# Patient Record
Sex: MALE | Race: WHITE
[De-identification: names, ages, dates, MRNs, and addresses within clinical notes are randomized per-mention and may not be internally consistent; named-entity substitution may affect disease eponyms.]

---

## 2019-11-29 ENCOUNTER — HOSPITAL ENCOUNTER (EMERGENCY)
Dept: HOSPITAL 97 - ER | Age: 24
Discharge: HOME | End: 2019-11-29
Payer: SELF-PAY

## 2019-11-29 DIAGNOSIS — F19.10: ICD-10-CM

## 2019-11-29 DIAGNOSIS — F17.210: ICD-10-CM

## 2019-11-29 DIAGNOSIS — F12.10: ICD-10-CM

## 2019-11-29 DIAGNOSIS — F14.10: Primary | ICD-10-CM

## 2019-11-29 LAB
ALBUMIN SERPL BCP-MCNC: 4.2 G/DL (ref 3.4–5)
ALP SERPL-CCNC: 68 U/L (ref 45–117)
ALT SERPL W P-5'-P-CCNC: 34 U/L (ref 12–78)
AST SERPL W P-5'-P-CCNC: 25 U/L (ref 15–37)
BUN BLD-MCNC: 13 MG/DL (ref 7–18)
GLUCOSE SERPLBLD-MCNC: 139 MG/DL (ref 74–106)
HCT VFR BLD CALC: 41.4 % (ref 39.6–49)
INR BLD: 1.09
LYMPHOCYTES # SPEC AUTO: 2.7 K/UL (ref 0.7–4.9)
MAGNESIUM SERPL-MCNC: 2.1 MG/DL (ref 1.8–2.4)
METHAMPHET UR QL SCN: POSITIVE
NT-PROBNP SERPL-MCNC: 8 PG/ML (ref ?–125)
PMV BLD: 9.6 FL (ref 7.6–11.3)
POTASSIUM SERPL-SCNC: 3.4 MMOL/L (ref 3.5–5.1)
RBC # BLD: 4.43 M/UL (ref 4.33–5.43)
THC SERPL-MCNC: POSITIVE NG/ML
TROPONIN (EMERG DEPT USE ONLY): < 0.02 NG/ML (ref 0–0.04)

## 2019-11-29 PROCEDURE — 80048 BASIC METABOLIC PNL TOTAL CA: CPT

## 2019-11-29 PROCEDURE — 84484 ASSAY OF TROPONIN QUANT: CPT

## 2019-11-29 PROCEDURE — 80076 HEPATIC FUNCTION PANEL: CPT

## 2019-11-29 PROCEDURE — 36415 COLL VENOUS BLD VENIPUNCTURE: CPT

## 2019-11-29 PROCEDURE — 85025 COMPLETE CBC W/AUTO DIFF WBC: CPT

## 2019-11-29 PROCEDURE — 71045 X-RAY EXAM CHEST 1 VIEW: CPT

## 2019-11-29 PROCEDURE — 80329 ANALGESICS NON-OPIOID 1 OR 2: CPT

## 2019-11-29 PROCEDURE — 83735 ASSAY OF MAGNESIUM: CPT

## 2019-11-29 PROCEDURE — 99285 EMERGENCY DEPT VISIT HI MDM: CPT

## 2019-11-29 PROCEDURE — 93005 ELECTROCARDIOGRAM TRACING: CPT

## 2019-11-29 PROCEDURE — 80307 DRUG TEST PRSMV CHEM ANLYZR: CPT

## 2019-11-29 PROCEDURE — 80320 DRUG SCREEN QUANTALCOHOLS: CPT

## 2019-11-29 PROCEDURE — 96361 HYDRATE IV INFUSION ADD-ON: CPT

## 2019-11-29 PROCEDURE — 96360 HYDRATION IV INFUSION INIT: CPT

## 2019-11-29 PROCEDURE — 85730 THROMBOPLASTIN TIME PARTIAL: CPT

## 2019-11-29 PROCEDURE — 85610 PROTHROMBIN TIME: CPT

## 2019-11-29 PROCEDURE — 83880 ASSAY OF NATRIURETIC PEPTIDE: CPT

## 2019-11-29 NOTE — EDPHYS
Physician Documentation                                                                           

 [facility]                                                                 

Name: Anam Johnston                                                                               

Age: 24 yrs                                                                                       

Sex: Male                                                                                         

: 1995                                                                                   

MRN: [id number]                                                                                   

Arrival Date: 2019                                                                          

Time: 18:41                                                                                       

Account#: [id number]                                                                            

Bed 25                                                                                            

Private MD:                                                                                       

ED Physician Jakob Gonsalez                                                                      

HPI:                                                                                              

                                                                                             

21:09 This 24 yrs old  Male presents to ER via Ambulatory with complaints of Chest   jmm 

      Pain.                                                                                       

21:09 The patient or guardian reports chest pain that is located primarily in the substernal  [facility] 

      area. The pain does not radiate. Associated signs and symptoms: Pertinent negatives:        

      abdominal pain, cough. The chest pain is described as aching, a pressure, sharp.            

      Duration: The patient or guardian reports a single episode, that is still ongoing, but      

      improving. Modifying factors: The symptoms are alleviated by nothing. the symptoms are      

      aggravated by nothing. This is a 24 year old male with no chronic medical conditions        

      that presents to the ED with complaints of chest pain beginning today at around 5 pm        

      today while sitting on the cough. Patient admits to cocaine, adderall, and marijuana        

      use last evening. Denies use today. .                                                       

                                                                                                  

Historical:                                                                                       

- Allergies:                                                                                      

18:49 No Known Allergies;                                                                     aa5 

- PMHx:                                                                                           

18:49 None;                                                                                   aa5 

- PSHx:                                                                                           

18:49 None;                                                                                   aa5 

                                                                                                  

- Immunization history:: Flu vaccine is not up to date.                                           

- Social history:: Smoking status: Patient uses tobacco products, smokes one-half pack            

  cigarettes per day, Patient uses street drugs, cocaine, marijuana, Methamphetamine              

  (Meth).                                                                                         

- Ebola Screening: : No symptoms or risks identified at this time.                                

                                                                                                  

                                                                                                  

ROS:                                                                                              

21:09 Constitutional: Negative for fever, chills, and weight loss.                            jmm 

21:09 Abdomen/GI: Negative for abdominal pain, nausea, vomiting, diarrhea, and constipation.      

21:09 Cardiovascular: Positive for chest pain.                                                    

21:09 All other systems are negative.                                                             

                                                                                                  

Exam:                                                                                             

21:09 Constitutional:  This is a well developed, well nourished patient who is awake, alert,  jmm 

      and in no acute distress. Head/Face:  atraumatic. Eyes:  EOMI, no conjunctival erythema     

      appreciated ENT:  Moist Mucus Membranes Neck:  Trachea midline, Supple Chest/axilla:        

      Normal chest wall appearance and motion.   Cardiovascular:  Regular rate and rhythm.        

      No edema appreciated                                                                        

21:09 Back:  Normal ROM Skin:  General appearance color normal MS/ Extremity:  Moves all          

      extremities, no obvious deformities appreciated, no edema noted to the lower                

      extremities  Neuro:  Awake and alert, normal gait Psych:  Behavior is normal, Mood is       

      normal, Patient is cooperative and pleasant                                                 

21:09 Cardiovascular: Rate: normal, Rhythm: regular, Pulses: no pulse deficits are                

      appreciated.                                                                                

                                                                                                  

Vital Signs:                                                                                      

18:48  / 105; Pulse 128; Resp 22 S; Temp 99.2(TE); Pulse Ox 100% on R/A; Weight 97.52   aa5 

      kg (R); Height 6 ft. 1 in. (185.42 cm) (R); Pain 0/10;                                      

19:45  / 88; Pulse 101; Resp 22; Pulse Ox 100% on R/A;                                  wh  

20:57  / 92; Pulse 92; Resp 18; Pulse Ox 100% on R/A;                                   wh  

22:22  / 83; Pulse 74; Resp 19; Pulse Ox 100% on R/A;                                   wh  

18:48 Body Mass Index 28.37 (97.52 kg, 185.42 cm)                                             aa5 

                                                                                                  

MDM:                                                                                              

19:47 Patient medically screened.                                                             paul 

22:39 Data reviewed: vital signs, nurses notes. Counseling: I had a detailed discussion with  crow 

      the patient and/or guardian regarding: the historical points, exam findings, and any        

      diagnostic results supporting the discharge/admit diagnosis, lab results, radiology         

      results, the need for outpatient follow up, to return to the emergency department if        

      symptoms worsen or persist or if there are any questions or concerns that arise at          

      home. ED course: Pain has decreased in the ED. Patient advised to discontinue drug use.     

      Labs/ekg not consistent with ACS. .                                                         

                                                                                                  

                                                                                             

19:34 Order name: Acetaminophen                                                                 

                                                                                             

19:34 Order name: Basic Metabolic Panel                                                         

                                                                                             

19:34 Order name: CBC with Diff                                                                 

                                                                                             

19:34 Order name: ETOH Level                                                                    

                                                                                             

19:34 Order name: Hepatic Function                                                              

                                                                                             

19:34 Order name: PT-INR                                                                        

                                                                                             

19:34 Order name: Ptt, Activated                                                                

                                                                                             

19:34 Order name: Salicylate                                                                    

                                                                                             

19:34 Order name: Urine Drug Screen                                                             

                                                                                             

19:40 Order name: Magnesium                                                                   [facility] 

                                                                                             

19:40 Order name: NT PRO-BNP                                                                  [facility] 

                                                                                             

19:40 Order name: Troponin (emerg Dept Use Only)                                              [facility] 

                                                                                             

20:09 Order name: CBC with Automated Diff; Complete Time: 20:24                               [facility]

                                                                                             

20:09 Order name: Protime (+INR); Complete Time: 20:24                                        [facility]

                                                                                             

19:34 Order name: EKG; Complete Time: 19:35                                                     

                                                                                             

19:34 Order name: EKG - Nurse/Tech; Complete Time: 19:46                                        

                                                                                             

19:34 Order name: IV Saline Lock; Complete Time: 19:46                                          

                                                                                             

19:40 Order name: XRAY Chest (1 view)                                                         [facility] 

                                                                                             

20:09 Order name: PTT, Activated Partial Thromb; Complete Time: 20:24                         [facility]

                                                                                             

20:13 Order name: Alcohol Serum/Plasma; Complete Time: 20:24                                  [facility]

                                                                                             

20:26 Order name: Troponin (Emerg Dept Use Only); Complete Time: 20:34                        [facility]

                                                                                             

20:26 Order name: NT PRO-BNP; Complete Time: 20:34                                            [facility]

                                                                                             

20:26 Order name: Magnesium; Complete Time: 20:34                                             [facility]

                                                                                             

21:06 Order name: RAD; Complete Time: 21:29                                                   [facility]

                                                                                             

21:09 Order name: Basic Metabolic Panel; Complete Time: 21:29                                 [facility]

                                                                                             

21:09 Order name: Liver (Hepatic) Function; Complete Time: 21:29                              [facility]

                                                                                             

21:09 Order name: Acetaminophen Level; Complete Time: 21:29                                   [facility]

                                                                                             

21:36 Order name: Urine Drug Screen; Complete Time: 21:38                                     [facility]

                                                                                             

21:38 Order name: Salicylates Level; Complete Time: 21:39                                     [facility]

                                                                                             

19:34 Order name: Labs collected and sent; Complete Time: 19:46                                 

                                                                                             

19:34 Order name: Urine Dipstick-Ancillary (obtain specimen); Complete Time: 21:08              

                                                                                             

19:40 Order name: Cardiac monitoring; Complete Time: 19:45                                    [facility] 

                                                                                             

19:40 Order name: O2 Per Protocol; Complete Time: 19:45                                       [facility] 

                                                                                             

19:40 Order name: O2 Sat Monitoring; Complete Time: 19:45                                     [facility] 

                                                                                                  

Administered Medications:                                                                         

20:06 Drug: NS 0.9% 1000 ml Route: IV; Rate: 1 bolus; Site: left antecubital;                   

22:23 Follow up: Response: No adverse reaction; IV Status: Completed infusion                   

                                                                                                  

                                                                                                  

Disposition:                                                                                      

19 22:40 Discharged to Home. Impression: Chest pain, unspecified, Drug Abuse.               

- Condition is Stable.                                                                            

- Discharge Instructions: Nonspecific Chest Pain, Drug Overdose.                                  

                                                                                                  

- Medication Reconciliation Form, Thank You Letter, Antibiotic Education, Prescription            

  Opioid Use form.                                                                                

- Follow up: Private Physician; When: 2 - 3 days; Reason: Recheck today's complaints,             

  Continuance of care, Re-evaluation by your physician.                                           

                                                                                                  

                                                                                                  

                                                                                                  

Addendum:                                                                                         

2019                                                                                        

     10:44 Co-signature as Attending Physician, Jakob Gonsalez MD I agree with the assessment and  c
ha

           plan of care. PA/NP's history reviewed, patient interviewed, and examined.             

                                                                                                  

Signatures:                                                                                       

Dispatcher [employer]                           EDJakob Blair MD MD cha Mickail, Joel, PA PA jmm Calderon, Audri, RN                     RN   aa5                                                  

Mariely Solomon                                                                                   

                                                                                                  

Corrections: (The following items were deleted from the chart)                                    

                                                                                             

22:41 22:40 2019 22:40 Discharged to Home. Impression: Chest pain, unspecified.         [facility] 

      Condition is Stable. Forms are Medication Reconciliation Form, Thank You Letter,            

      Antibiotic Education, Prescription Opioid Use. Follow up: Private Physician; When: 2 -      

      3 days; Reason: Recheck today's complaints, Continuance of care, Re-evaluation by your      

      physician. [facility]                                                                              

22:55 22:41 2019 22:40 Discharged to Home. Impression: Chest pain, unspecified; Drug    wh  

      Abuse. Condition is Stable. Discharge Instructions: Nonspecific Chest Pain, Drug            

      Overdose. Forms are Medication Reconciliation Form, Thank You Letter, Antibiotic            

      Education, Prescription Opioid Use. Follow up: Private Physician; When: 2 - 3 days;         

      Reason: Recheck today's complaints, Continuance of care, Re-evaluation by your              

      physician. [facility]                                                                              

                                                                                                  

**************************************************************************************************

## 2019-11-29 NOTE — RAD REPORT
EXAM DESCRIPTION:  RAD - Chest Single View - 11/29/2019 7:55 pm

 

CLINICAL HISTORY:  Chest pain

 

COMPARISON:  None.

 

TECHNIQUE:  AP portable chest image was obtained 1953 hours .

 

FINDINGS:  Lungs are clear. Heart and vasculature are normal. No measurable pleural effusion and no p
neumothorax. No acute bony abnormality seen. No acute aortic findings suspected.

 

IMPRESSION:  No acute cardiopulmonary process.

## 2019-11-29 NOTE — ER
Nurse's Notes                                                                                     

 [facility]                                                                 

Name: Anam Johnston                                                                               

Age: 24 yrs                                                                                       

Sex: Male                                                                                         

: 1995                                                                                   

MRN: [id number]                                                                                   

Arrival Date: 2019                                                                          

Time: 18:41                                                                                       

Account#: [id number]                                                                            

Bed 25                                                                                            

Private MD:                                                                                       

Diagnosis: Chest pain, unspecified;Drug Abuse                                                     

                                                                                                  

Presentation:                                                                                     

                                                                                             

18:47 Presenting complaint: Patient states: heart palpitations that began 30 minutes PTA. Pt  aa5 

      reports doing meth,cocaine, marijuana, and Adderall last night. Transition of care:         

      patient was not received from another setting of care. Onset of symptoms was 2019. Risk Assessment: Do you want to hurt yourself or someone else? Patient            

      reports no desire to harm self or others. Initial Sepsis Screen: Does the patient meet      

      any 2 criteria? HR > 90 bpm. Does the patient have a suspected source of infection? No.     

      Patient's initial sepsis screen is negative. Care prior to arrival: None.                   

18:47 Method Of Arrival: Ambulatory                                                           aa5 

18:47 Acuity: DARRELL 2                                                                           aa5 

                                                                                                  

Historical:                                                                                       

- Allergies:                                                                                      

18:49 No Known Allergies;                                                                     aa5 

- PMHx:                                                                                           

18:49 None;                                                                                   aa5 

- PSHx:                                                                                           

18:49 None;                                                                                   aa5 

                                                                                                  

- Immunization history:: Flu vaccine is not up to date.                                           

- Social history:: Smoking status: Patient uses tobacco products, smokes one-half pack            

  cigarettes per day, Patient uses street drugs, cocaine, marijuana, Methamphetamine              

  (Meth).                                                                                         

- Ebola Screening: : No symptoms or risks identified at this time.                                

                                                                                                  

                                                                                                  

Screenin:00 Abuse screen: Denies threats or abuse. Denies injuries from another. Nutritional        wh  

      screening: No deficits noted. Tuberculosis screening: No symptoms or risk factors           

      identified. Fall Risk None identified.                                                      

                                                                                                  

Assessment:                                                                                       

19:35 General: Appears in no apparent distress. Behavior is calm, cooperative, appropriate    wh  

      for age. Pain: Complains of pain in chest Pain does not radiate. Pain currently is 8        

      out of 10 on a pain scale. Pain began suddenly. Neuro: Level of Consciousness is awake,     

      alert, obeys commands, Oriented to person, place, time, situation, Appropriate for age.     

      Cardiovascular: Reports chest pain, Heart tones S1 S2 Rhythm is sinus tachycardia.          

      Respiratory: Airway is patent Respiratory effort is even, unlabored, Respiratory            

      pattern is regular, symmetrical. GI: No signs and/or symptoms were reported involving       

      the gastrointestinal system. GI: Abdomen is flat, non-distended. : No signs and/or        

      symptoms were reported regarding the genitourinary system. EENT: No signs and/or            

      symptoms were reported regarding the EENT system. Derm: Skin is intact, is healthy with     

      good turgor, Skin is pink, warm \T\ dry. normal. Musculoskeletal: Circulation, motion,      

      and sensation intact.                                                                       

20:56 Reassessment: Patient appears in no apparent distress at this time. No changes from       

      previously documented assessment. Patient and/or family updated on plan of care and         

      expected duration. Pain level reassessed. Patient is alert, oriented x 3, equal             

      unlabored respirations, skin warm/dry/pink.                                                 

22:22 Reassessment: Patient appears in no apparent distress at this time. No changes from       

      previously documented assessment. Patient and/or family updated on plan of care and         

      expected duration. Pain level reassessed. Patient is alert, oriented x 3, equal             

      unlabored respirations, skin warm/dry/pink.                                                 

                                                                                                  

Vital Signs:                                                                                      

18:48  / 105; Pulse 128; Resp 22 S; Temp 99.2(TE); Pulse Ox 100% on R/A; Weight 97.52   aa5 

      kg (R); Height 6 ft. 1 in. (185.42 cm) (R); Pain 0/10;                                      

19:45  / 88; Pulse 101; Resp 22; Pulse Ox 100% on R/A;                                  wh  

20:57  / 92; Pulse 92; Resp 18; Pulse Ox 100% on R/A;                                   wh  

22:22  / 83; Pulse 74; Resp 19; Pulse Ox 100% on R/A;                                   wh  

18:48 Body Mass Index 28.37 (97.52 kg, 185.42 cm)                                             aa5 

                                                                                                  

ED Course:                                                                                        

18:41 Patient arrived in ED.                                                                  aa5 

18:47 Arm band placed on.                                                                     aa5 

19:04 Triage completed.                                                                       aa5 

19:05 EKG completed in triage. Results shown to MD.                                           aa5 

19:07 Mariely Solomon is Primary Nurse.                                                           

19:27 Ruddy Kovacs PA is PHCP.                                                              [facility] 

19:27 Jakob Gonsalez MD is Attending Physician.                                             [facility] 

19:30 Patient maintains SpO2 saturation greater than 95% on room air.                           

19:51 Initial lab(s) drawn, by me, sent to lab. Inserted saline lock: 20 gauge in left        cm6 

      antecubital area, using aseptic technique.                                                  

21:00 Patient has correct armband on for positive identification. Bed in low position. Call     

      light in reach. Side rails up X 1. Cardiac monitor on. Pulse ox on. NIBP on.                

22:53 No provider procedures requiring assistance completed. IV discontinued, intact,           

      bleeding controlled, No redness/swelling at site.                                           

                                                                                                  

Administered Medications:                                                                         

20:06 Drug: NS 0.9% 1000 ml Route: IV; Rate: 1 bolus; Site: left antecubital;                   

22:23 Follow up: Response: No adverse reaction; IV Status: Completed infusion                   

                                                                                                  

                                                                                                  

Outcome:                                                                                          

22:40 Discharge ordered by MD.                                                                crwo 

22:54 Discharged to home ambulatory, with family.                                               

22:54 Condition: stable                                                                           

22:54 Discharge instructions given to patient, Instructed on discharge instructions, follow       

      up and referral plans. POC non Specific chest pain and drug overdose Demonstrated           

      understanding of instructions, follow-up care, POC                                          

22:55 Patient left the ED.                                                                      

                                                                                                  

Signatures:                                                                                       

Ruddy Kovacs PA PA jmm Calderon, Audri, RN                     RN   aa5                                                  

Mariely Solomon Candace cm6                                                  

                                                                                                  

**************************************************************************************************

## 2019-11-30 VITALS — SYSTOLIC BLOOD PRESSURE: 132 MMHG | DIASTOLIC BLOOD PRESSURE: 83 MMHG

## 2019-11-30 VITALS — TEMPERATURE: 99.2 F | OXYGEN SATURATION: 100 %

## 2019-11-30 NOTE — EKG
Test Date:    2019-11-29               Test Time:    19:06:37

Technician:   DEMETRIST                                    

                                                     

MEASUREMENT RESULTS:                                       

Intervals:                                           

Rate:         121                                    

VT:           166                                    

QRSD:         98                                     

QT:           306                                    

QTc:          434                                    

Axis:                                                

P:            64                                     

VT:           166                                    

QRS:          4                                      

T:            41                                     

                                                     

INTERPRETIVE STATEMENTS:                                       

                                                     

Sinus tachycardia

Otherwise normal ECG

No previous ECG available for comparison



Electronically Signed On 11-30-19 08:22:29 CST by Burak Mike

## 2023-06-20 ENCOUNTER — HOSPITAL ENCOUNTER (EMERGENCY)
Dept: HOSPITAL 97 - ER | Age: 28
Discharge: HOME | End: 2023-06-20
Payer: SELF-PAY

## 2023-06-20 VITALS — DIASTOLIC BLOOD PRESSURE: 91 MMHG | TEMPERATURE: 98.1 F | SYSTOLIC BLOOD PRESSURE: 135 MMHG | OXYGEN SATURATION: 98 %

## 2023-06-20 DIAGNOSIS — L03.115: Primary | ICD-10-CM

## 2023-06-20 PROCEDURE — 96372 THER/PROPH/DIAG INJ SC/IM: CPT

## 2023-06-20 PROCEDURE — 99284 EMERGENCY DEPT VISIT MOD MDM: CPT

## 2023-06-20 NOTE — ER
Nurse's Notes                                                                                     

 [facility]                                                                 

Name: Anam Johnston                                                                               

Age: 27 yrs                                                                                       

Sex: Male                                                                                         

: 1995                                                                                   

MRN: [id number]                                                                                   

Arrival Date: 2023                                                                          

Time: 09:45                                                                                       

Account#: [id number]                                                                            

Bed 11                                                                                            

Private MD:                                                                                       

Diagnosis: Cutaneous abscess of limb, unspecified;Cellulitis of left lower limb                   

                                                                                                  

Presentation:                                                                                     

                                                                                             

10:06 Chief complaint: Patient states: noticed an abscess on left knee , got bigger     iw  

      yesterday and it turned black today it looks better but it hurts a lot around the knee.     

      Coronavirus screen: At this time, the client does not indicate any symptoms associated      

      with coronavirus-19. Ebola Screen: Patient negative for fever greater than or equal to      

      101.5 degrees Fahrenheit, and additional compatible Ebola Virus Disease symptoms            

      Patient denies exposure to infectious person. Patient denies travel to an                   

      Ebola-affected area in the 21 days before illness onset. No symptoms or risks               

      identified at this time. Initial Sepsis Screen: Does the patient meet any 2 criteria?       

      No. Patient's initial sepsis screen is negative. Does the patient have a suspected          

      source of infection? No. Patient's initial sepsis screen is negative. Risk Assessment:      

      Do you want to hurt yourself or someone else? Patient reports no desire to harm self or     

      others. Onset of symptoms was 2023.                                                

10:06 Method Of Arrival: Ambulatory                                                           iw  

10:06 Acuity: DARRELL 4                                                                           iw  

                                                                                                  

Historical:                                                                                       

- Allergies:                                                                                      

10:07 No Known Allergies;                                                                     iw  

- Home Meds:                                                                                      

10:07 None [Active];                                                                          iw  

- PMHx:                                                                                           

10:07 None;                                                                                   iw  

- PSHx:                                                                                           

10:07 None;                                                                                   iw  

                                                                                                  

- Immunization history:: Adult Immunizations Client reports receiving the 2nd dose of             

  the Covid vaccine.                                                                              

- Social history:: Smoking status: Patient reports the use of cigarette tobacco                   

  products.                                                                                       

                                                                                                  

                                                                                                  

Screening:                                                                                        

10:13 [facility] ED Fall Risk Assessment (Adult) History of falling in the last 3 months,       iw  

      including since admission No falls in past 3 months (0 pts). Abuse screen: Denies           

      threats or abuse. Denies injuries from another. Nutritional screening: No deficits          

      noted. Tuberculosis screening: No symptoms or risk factors identified.                      

                                                                                                  

Assessment:                                                                                       

10:12 General: Appears in no apparent distress. Behavior is calm, cooperative. Pain:          iw  

      Complains of pain in left knee. Neuro: Level of Consciousness is awake, alert, obeys        

      commands, Oriented to person, place, time, situation, Moves all extremities. Full           

      function. Cardiovascular: Patient's skin is warm and dry. Respiratory: Respiratory          

      effort is even, unlabored, Respiratory pattern is regular, symmetrical. Derm: Skin is       

      intact, is healthy with good turgor, Skin is pink, warm \T\ dry. Abscess located on left    

      knee is dime sized, is red, was lanced by patient prior to arrival. Musculoskeletal:        

      Range of motion: intact in all extremities.                                                 

11:32 Reassessment: Patient is alert, oriented x 3, equal unlabored respirations, skin        aa5 

      warm/dry/pink.                                                                              

                                                                                                  

Vital Signs:                                                                                      

10:06  / 91; Pulse 89; Resp 16; Temp 98.1; Pulse Ox 98% ; Weight 102.06 kg; Height 6    iw  

      ft. 1 in. ; Pain 5/10;                                                                      

10:06 Body Mass Index 29.68 (102.06 kg, 185.42 cm)                                            iw  

10:06 Pain Scale: Adult                                                                       iw  

                                                                                                  

ED Course:                                                                                        

09:47 Patient arrived in ED.                                                                  am2 

10:07 Triage completed.                                                                       iw  

10:08 Arm band placed on.                                                                     iw  

10:12 Ciera Myles, RN is Primary Nurse.                                                   iw  

10:13 Jakob Gonsalez MD is Attending Physician.                                             paul 

11:02 Emmanuel Osullivan MD is Referral Physician.                                                 paul 

11:32 No provider procedures requiring assistance completed. Patient did not have IV access   aa5 

      during this emergency room visit.                                                           

                                                                                                  

Administered Medications:                                                                         

11:14 Drug: Doxycycline  mg Route: PO;                                                  aa5 

11:33 Follow up: Response: No adverse reaction                                                aa5 

11:15 Drug: Trimethoprim-Sulfamethoxazole PO (160 mg-800 mg (DS) 2 tablet Route: PO;          aa5 

11:33 Follow up: Response: No adverse reaction                                                aa5 

11:15 Drug: Mupirocin Topical Ointment 2 % 1 application Route: Topical; Site: affected area; aa5 

11:15 CANCELLED (Duplicate Order): Ketorolac IM 60 mg IM once                                 aa5 

11:15 Drug: Ketorolac IM 30 mg Route: IM; Site: right deltoid;                                aa5 

11:32 Follow up: Response: No adverse reaction                                                aa5 

                                                                                                  

                                                                                                  

Medication:                                                                                       

11:32 VIS not applicable for this client.                                                     aa5 

                                                                                                  

Outcome:                                                                                          

11:03 Discharge ordered by MD.                                                                paul 

11:32 Discharged to home ambulatory.                                                          aa5 

11:32 Condition: stable                                                                           

11:32 Discharge instructions given to patient, Instructed on discharge instructions, follow       

      up and referral plans. medication usage, Demonstrated understanding of instructions,        

      follow-up care, medications, Prescriptions given X 4.                                       

11:33 Patient left the ED.                                                                    aa5 

                                                                                                  

Signatures:                                                                                       

Jakob Gonsalez MD MD cha Williams, Irene RN                     RN   Johnna Peacock RN                     RN   aa5                                                  

Brooklynn Allison                                                  

                                                                                                  

**************************************************************************************************

## 2023-06-20 NOTE — EDPHYS
Physician Documentation                                                                           

 [facility]                                                                 

Name: Anam Johnston                                                                               

Age: 27 yrs                                                                                       

Sex: Male                                                                                         

: 1995                                                                                   

MRN: [id number]                                                                                   

Arrival Date: 2023                                                                          

Time: 09:45                                                                                       

Account#: [id number]                                                                            

Bed 11                                                                                            

Private MD:                                                                                       

[facility] Physician Jakob Gonsalez                                                                      

HPI:                                                                                              

                                                                                             

10:59 This 27 yrs old Black Male presents to ER via Ambulatory with complaints of Insect      paul 

      Bite, knee swelling.                                                                        

                                                                                                  

Historical:                                                                                       

- Allergies:                                                                                      

10:07 No Known Allergies;                                                                     iw  

- Home Meds:                                                                                      

10:07 None [Active];                                                                          iw  

- PMHx:                                                                                           

10:07 None;                                                                                   iw  

- PSHx:                                                                                           

10:07 None;                                                                                   iw  

                                                                                                  

- Immunization history:: Adult Immunizations Client reports receiving the 2nd dose of             

  the Covid vaccine.                                                                              

- Social history:: Smoking status: Patient reports the use of cigarette tobacco                   

  products.                                                                                       

                                                                                                  

                                                                                                  

ROS:                                                                                              

10:59 Constitutional: Negative for fever, chills, and weight loss, Eyes: Negative for injury, paul 

      pain, redness, and discharge, ENT: Negative for injury, pain, and discharge, Neck:          

      Negative for injury, pain, and swelling, Cardiovascular: Negative for chest pain,           

      palpitations, and edema, Respiratory: Negative for shortness of breath, cough,              

      wheezing, and pleuritic chest pain, Abdomen/GI: Negative for abdominal pain, nausea,        

      vomiting, diarrhea, and constipation, Back: Negative for injury and pain, : Negative      

      for injury, bleeding, discharge, and swelling, Skin: Negative for injury, rash, and         

      discoloration, Neuro: Negative for headache, weakness, numbness, tingling, and seizure,     

      Psych: Negative for depression, anxiety, suicide ideation, homicidal ideation, and          

      hallucinations, Allergy/Immunology: Negative for hives, rash, and allergies, Endocrine:     

      Negative for neck swelling, polydipsia, polyuria, polyphagia, and marked weight             

      changes, Hematologic/Lymphatic: Negative for swollen nodes, abnormal bleeding, and          

      unusual bruising.                                                                           

10:59 MS/extremity: Positive for decreased range of motion, pain, swelling, tenderness, of        

      the right knee.                                                                             

                                                                                                  

Exam:                                                                                             

10:59 Constitutional:  This is a well developed, well nourished patient who is awake, alert,  paul 

      and in no acute distress. Head/Face:  Normocephalic, atraumatic. Eyes:  Pupils equal        

      round and reactive to light, extra-ocular motions intact.  Lids and lashes normal.          

      Conjunctiva and sclera are non-icteric and not injected.  Cornea within normal limits.      

      Periorbital areas with no swelling, redness, or edema. ENT:  Nares patent. No nasal         

      discharge, no septal abnormalities noted.  Tympanic membranes are normal and external       

      auditory canals are clear.  Oropharynx with no redness, swelling, or masses, exudates,      

      or evidence of obstruction, uvula midline.  Mucous membranes moist. Neck:  Trachea          

      midline, no thyromegaly or masses palpated, and no cervical lymphadenopathy.  Supple,       

      full range of motion without nuchal rigidity, or vertebral point tenderness.  No            

      Meningismus. Chest/axilla:  Normal chest wall appearance and motion.  Nontender with no     

      deformity.  No lesions are appreciated. Cardiovascular:  Regular rate and rhythm with a     

      normal S1 and S2.  No gallops, murmurs, or rubs.  Normal PMI, no JVD.  No pulse             

      deficits. Respiratory:  Lungs have equal breath sounds bilaterally, clear to                

      auscultation and percussion.  No rales, rhonchi or wheezes noted.  No increased work of     

      breathing, no retractions or nasal flaring. Abdomen/GI:  Soft, non-tender, with normal      

      bowel sounds.  No distension or tympany.  No guarding or rebound.  No evidence of           

      tenderness throughout. Back:  No spinal tenderness.  No costovertebral tenderness.          

      Full range of motion. Male :  Normal genitalia with no discharge or lesions. Neuro:       

      Awake and alert, GCS 15, oriented to person, place, time, and situation.  Cranial           

      nerves II-XII grossly intact.  Motor strength 5/5 in all extremities.  Sensory grossly      

      intact.  Cerebellar exam normal.  Normal gait. Psych:  Awake, alert, with orientation       

      to person, place and time.  Behavior, mood, and affect are within normal limits.            

10:59 Skin: cellulitis, that is mild, induration, that is moderate is noted, injury, is not       

      appreciated.                                                                                

                                                                                                  

Vital Signs:                                                                                      

10:06  / 91; Pulse 89; Resp 16; Temp 98.1; Pulse Ox 98% ; Weight 102.06 kg; Height 6    iw  

      ft. 1 in. ; Pain 5/10;                                                                      

10:06 Body Mass Index 29.68 (102.06 kg, 185.42 cm)                                            iw  

10:06 Pain Scale: Adult                                                                       iw  

                                                                                                  

Procedures:                                                                                       

11:05 Performed prepped and and unroofed abscess, pus expressed, cleaned and dressed covered. paul 

                                                                                                  

MDM:                                                                                              

10:13 Patient medically screened.                                                             [facility] 

10:59 Differential diagnosis: allergic reaction, cellulitis, insect bite, contusion,          paul 

      abrasion. Data reviewed: vital signs, nurses notes. Consideration of                        

      Admission/Observation Escalation of care including admission/observation considered. I      

      considered the following discharge prescriptions or medication management in the            

      emergency department Medications were administered in the Emergency Department. See         

      MAR. Test considered but Not performed: Labs: no labs. Care significantly affected by       

      the following chronic conditions: none.                                                     

                                                                                                  

                                                                                             

10:59 Order name: Wound dressing; Complete Time: 11:14                                        [facility] 

                                                                                             

11:01 Order name: Ice pack; Complete Time: 11:14                                              paul 

                                                                                                  

Administered Medications:                                                                         

11:14 Drug: Doxycycline  mg Route: PO;                                                  aa5 

11:33 Follow up: Response: No adverse reaction                                                aa5 

11:15 Drug: Trimethoprim-Sulfamethoxazole PO (160 mg-800 mg (DS) 2 tablet Route: PO;          aa5 

11:33 Follow up: Response: No adverse reaction                                                aa5 

11:15 Drug: Mupirocin Topical Ointment 2 % 1 application Route: Topical; Site: affected area; aa5 

11:15 CANCELLED (Duplicate Order): Ketorolac IM 60 mg IM once                                 aa5 

11:15 Drug: Ketorolac IM 30 mg Route: IM; Site: right deltoid;                                aa5 

11:32 Follow up: Response: No adverse reaction                                                aa5 

                                                                                                  

                                                                                                  

Disposition Summary:                                                                              

23 11:03                                                                                    

Discharge Ordered                                                                                 

      Location: Home                                                                          paul 

      Problem: new                                                                            paul 

      Symptoms: have improved                                                                 paul 

      Condition: Stable                                                                       paul 

      Diagnosis                                                                                   

        - Cutaneous abscess of limb, unspecified                                              paul 

        - Cellulitis of left lower limb                                                       paul 

      Followup:                                                                               paul 

        - With: Private Physician                                                                  

        - When: 2 - 3 days                                                                         

        - Reason: Recheck today's complaints, Continuance of care, Re-evaluation by your           

      physician                                                                                   

      Followup:                                                                               paul 

        - With: Emmanuel Osullivan MD                                                                   

        - When: 2 - 3 days                                                                         

        - Reason: Recheck today's complaints, Continuance of care, Re-evaluation by your           

      physician                                                                                   

      Discharge Instructions:                                                                     

        - Discharge Summary Sheet                                                             paul 

        - Skin Abscess                                                                        paul 

        - Cellulitis, Adult                                                                   paul 

        - Incision and Drainage                                                               paul 

        - Cellulitis, Adult, Easy-to-Read                                                     paul 

        - Incision and Drainage, Care After                                                   paul 

      Forms:                                                                                      

        - Medication Reconciliation Form                                                      paul 

        - Thank You Letter                                                                    paul 

        - Antibiotic Education                                                                paul 

        - Prescription Opioid Use                                                             paul 

        - Work release form                                                                   aa5 

      Prescriptions:                                                                              

        - Centany 2 % Topical ointment                                                             

            - apply 1 application by TOPICAL route 4 times per day; 15 gram; Refills: 0,      [facility] 

      Product Selection Permitted                                                                 

        - Ibuprofen 600 mg Oral Tablet                                                             

            - take 1 tablet by ORAL route every 6 hours As needed take with food; 30 tablet;  paul 

      Refills: 0, Product Selection Permitted                                                     

        - Doxycycline Hyclate 100 mg Oral Tablet                                                   

            - take 1 tablet by ORAL route every 12 hours; 20 tablet; Refills: 0, Product      [facility] 

      Selection Permitted                                                                         

        - Bactrim -160 mg Oral Tablet                                                        

            - take 1 tablet by ORAL route every 12 hours for 10 days; 20 tablet; Refills: 0,  [facility] 

      Product Selection Permitted                                                                 

Signatures:                                                                                       

Jakob Gonsalez MD MD cha Williams, Irene RN                     Johnna Melendez RN                     RN   aa5                                                  

                                                                                                  

Corrections: (The following items were deleted from the chart)                                    

11:15 11:01 Ketorolac IM 60 mg IM once ordered. [facility] 

11:15 11:15 Ketorolac IM 60 mg IM once ordered. 5                                           aa5 

                                                                                                  

**************************************************************************************************